# Patient Record
(demographics unavailable — no encounter records)

---

## 2024-10-10 NOTE — HISTORY OF PRESENT ILLNESS
[7] : 7 [0] : 0 [Burning] : burning [Dull/Aching] : dull/aching [Shooting] : shooting [Rest] : rest [Walking] : walking [Full time] : Work status: full time [de-identified] :  07/24/2024: felt pop playing bball 7/9. saw dr craig who sent for mri and referred for ongoing care. no prior ankle probs. walking in reg shoes. denies dm/tob. RN -- Liz is having trouble wb in sneakers. takes hydrocodone for back pain prn  8/2/2024: R achilles repair/posterior fasciotomy  8/12/2024: no complaints. nwb in splint. taking asa. Patient denies fevers, chills, or drainage.  08/22/2024:  no complaints.  nwb in boot w/ scooter.  Patient denies fevers, chills, drainage.    09/12/2024:  no complaints.   Started PT.  WB in boot. Patient denies fevers, chills, or drainage.  10/10/2024:  no complaints.  Attending PT.  Walking in boot. Patient denies fevers, chills, or drainage. [] : Post Surgical Visit: no [FreeTextEntry1] : right ankle [de-identified] : xrays and mris  [de-identified] : none  [de-identified] : RN

## 2024-12-06 NOTE — HISTORY OF PRESENT ILLNESS
[FreeTextEntry1] : Medication reconciliation [de-identified] : Right Achilles tendon rupture postop ADD Metabolic syndrome/hypertension/sinus tachycardia Migraine syndrome

## 2024-12-06 NOTE — PLAN
[FreeTextEntry1] : 42-year-old gentleman for evaluation Risk for sexually transmitted disease-a full panel is drawn ADD-renewal of Adderall is given Low testosterone in male-lab work is ordered for evaluation in the past she had a low testosterone but opted against replacement therapy

## 2024-12-06 NOTE — HEALTH RISK ASSESSMENT
[0] : 2) Feeling down, depressed, or hopeless: Not at all (0) [PHQ-2 Negative - No further assessment needed] : PHQ-2 Negative - No further assessment needed [[id number]] : 0